# Patient Record
Sex: FEMALE | Race: WHITE
[De-identification: names, ages, dates, MRNs, and addresses within clinical notes are randomized per-mention and may not be internally consistent; named-entity substitution may affect disease eponyms.]

---

## 2017-02-23 ENCOUNTER — HOSPITAL ENCOUNTER (OUTPATIENT)
Dept: HOSPITAL 10 - SDS | Age: 32
Discharge: HOME | End: 2017-02-23
Attending: SURGERY
Payer: COMMERCIAL

## 2017-02-23 VITALS
WEIGHT: 136.69 LBS | BODY MASS INDEX: 22.77 KG/M2 | WEIGHT: 136.69 LBS | HEIGHT: 65 IN | BODY MASS INDEX: 22.77 KG/M2 | HEIGHT: 65 IN

## 2017-02-23 VITALS — DIASTOLIC BLOOD PRESSURE: 67 MMHG | RESPIRATION RATE: 18 BRPM | HEART RATE: 71 BPM | SYSTOLIC BLOOD PRESSURE: 111 MMHG

## 2017-02-23 VITALS — SYSTOLIC BLOOD PRESSURE: 127 MMHG | HEART RATE: 60 BPM | RESPIRATION RATE: 19 BRPM | DIASTOLIC BLOOD PRESSURE: 80 MMHG

## 2017-02-23 VITALS — RESPIRATION RATE: 16 BRPM | SYSTOLIC BLOOD PRESSURE: 114 MMHG | HEART RATE: 64 BPM | DIASTOLIC BLOOD PRESSURE: 70 MMHG

## 2017-02-23 VITALS — HEART RATE: 64 BPM | SYSTOLIC BLOOD PRESSURE: 115 MMHG | RESPIRATION RATE: 16 BRPM | DIASTOLIC BLOOD PRESSURE: 63 MMHG

## 2017-02-23 VITALS — DIASTOLIC BLOOD PRESSURE: 80 MMHG | HEART RATE: 68 BPM | RESPIRATION RATE: 15 BRPM | SYSTOLIC BLOOD PRESSURE: 120 MMHG

## 2017-02-23 VITALS — SYSTOLIC BLOOD PRESSURE: 129 MMHG | DIASTOLIC BLOOD PRESSURE: 76 MMHG | RESPIRATION RATE: 19 BRPM | HEART RATE: 66 BPM

## 2017-02-23 VITALS — SYSTOLIC BLOOD PRESSURE: 115 MMHG | DIASTOLIC BLOOD PRESSURE: 70 MMHG | RESPIRATION RATE: 16 BRPM | HEART RATE: 64 BPM

## 2017-02-23 VITALS — SYSTOLIC BLOOD PRESSURE: 119 MMHG | HEART RATE: 70 BPM | RESPIRATION RATE: 12 BRPM | DIASTOLIC BLOOD PRESSURE: 71 MMHG

## 2017-02-23 VITALS — RESPIRATION RATE: 14 BRPM | HEART RATE: 66 BPM | DIASTOLIC BLOOD PRESSURE: 68 MMHG | SYSTOLIC BLOOD PRESSURE: 110 MMHG

## 2017-02-23 VITALS — SYSTOLIC BLOOD PRESSURE: 134 MMHG | DIASTOLIC BLOOD PRESSURE: 90 MMHG | RESPIRATION RATE: 18 BRPM | HEART RATE: 85 BPM

## 2017-02-23 VITALS — SYSTOLIC BLOOD PRESSURE: 115 MMHG | HEART RATE: 68 BPM | DIASTOLIC BLOOD PRESSURE: 63 MMHG | RESPIRATION RATE: 14 BRPM

## 2017-02-23 VITALS — DIASTOLIC BLOOD PRESSURE: 74 MMHG | SYSTOLIC BLOOD PRESSURE: 120 MMHG | HEART RATE: 72 BPM | RESPIRATION RATE: 15 BRPM

## 2017-02-23 VITALS — HEART RATE: 64 BPM | RESPIRATION RATE: 18 BRPM | DIASTOLIC BLOOD PRESSURE: 67 MMHG | SYSTOLIC BLOOD PRESSURE: 109 MMHG

## 2017-02-23 VITALS — SYSTOLIC BLOOD PRESSURE: 118 MMHG | HEART RATE: 62 BPM | RESPIRATION RATE: 22 BRPM | DIASTOLIC BLOOD PRESSURE: 75 MMHG

## 2017-02-23 DIAGNOSIS — K80.10: Primary | ICD-10-CM

## 2017-02-23 PROCEDURE — 84703 CHORIONIC GONADOTROPIN ASSAY: CPT

## 2017-02-23 PROCEDURE — 47562 LAPAROSCOPIC CHOLECYSTECTOMY: CPT

## 2017-02-23 PROCEDURE — 88304 TISSUE EXAM BY PATHOLOGIST: CPT

## 2017-02-23 RX ADMIN — HYDROMORPHONE HYDROCHLORIDE PRN MG: 2 INJECTION INTRAMUSCULAR; INTRAVENOUS; SUBCUTANEOUS at 14:50

## 2017-02-23 RX ADMIN — HYDROMORPHONE HYDROCHLORIDE PRN MG: 2 INJECTION INTRAMUSCULAR; INTRAVENOUS; SUBCUTANEOUS at 15:01

## 2017-02-23 RX ADMIN — HYDROMORPHONE HYDROCHLORIDE PRN MG: 2 INJECTION INTRAMUSCULAR; INTRAVENOUS; SUBCUTANEOUS at 14:55

## 2017-02-23 NOTE — OPPN
Date/Time of Note


Date/Time of Note


DATE: 2/23/17 


TIME: 14:46





Operative/Procedure Note


dictation [id number]


Pre-Operative Diagnosis


cholelithiasis, symptomatic


Post-Operative Diagnosis


same


Procedure


laparoscopic cholecystectomy


Surgeon:  KRYSTLE KIRK DO


Anesthesiologist:  BRIDGETTE MOORE MD


Findings


4 mm cystic duct


duct and artery separately clipped and ligated


Implants/Grafts:  Not applicable


Estimated blood loss:  0 - 10 ml's


Drains:  Not applicable


Specimens


gallbladder and contents


Complications:  None


Anesthesia type:  general











KRYSTLE KIRK DO Feb 23, 2017 14:53

## 2017-02-23 NOTE — HPN
Date/Time of Note


Date/Time of Note


DATE: 2/23/17 


TIME: 13:09





Interval H&P Admission Note


Pt. seen H&P reviewed:  No system changes











KRYSLTE KIRK DO Feb 23, 2017 13:09

## 2017-02-23 NOTE — OPR
DATE OF OPERATION:  02/23/2017

 

 

PREOPERATIVE DIAGNOSIS:  Symptomatic cholelithiasis with recurrent biliary colic.

 

POSTOPERATIVE DIAGNOSIS:  Symptomatic cholelithiasis with recurrent biliary colic.

 

OPERATION PERFORMED:  Laparoscopic cholecystectomy.

 

PRIMARY SURGEON:  Obey Vega DO

 

ANESTHESIA:  General endotracheal anesthesia.

 

ANESTHESIOLOGIST:  Sawyer Romano MD

 

ASA CLASSIFICATION:  2.

 

WOUND CLASSIFICATION:  Class 2 clean, contaminated wound.  

 

COUNTS:  All counts for instruments, needles and sponges were reported correct at the end of the shauna
e x2.

 

ESTIMATED BLOOD LOSS:  10 mL.

 

DRAINS:  None.

 

SPECIMEN:  Gallbladder and contents.

 

COMPLICATIONS:  None immediately apparent.

 

INDICATION FOR PROCEDURE:  Ms. Lainez is a 31-year-old young lady with a history of biliary 
colic and symptomatic cholelithiasis.  She desired cholecystectomy.  Risks, benefits, and alternativ
es as well as details of the operation were discussed with her at length including, but not limited 
to, bleeding, infection, scarring, hernia, neurologic complications, thromboembolic events, MI, stro
kes, retained or recurrent stones, injury to the common bile duct approximately 0.4% incidence repor
elie in the literature, need for reoperation, negative findings, cardiopulmonary complications, up to
 and including the risk of death or injury to surrounding structures and intraperitoneal organs.  De
spite these and other risks, she wished to proceed with the procedure as planned.  All questions wer
e asked and answered.  Informed consent was verified and documented.

 

DESCRIPTION OF PROCEDURE IN DETAIL:  Ms. Lainez was brought to the operating room and placed
 in supine position on the operating table.  Cardiopulmonary monitors were placed on.  General endot
vikas anesthesia was initiated and the abdomen was prepped and draped in typical surgical fashion.
  A 5 mm supraumbilical incision was made and dissected down to the subcutaneous tissues.  The fasci
a was retracted and a Veress needle was used to access the peritoneal cavity.  The abdomen was insuf
flated to 15 mmHg with CO2, which the patient tolerated well without change in hemodynamic pressures
.  A 5 mm blunt _____ trocar was placed under direct laparoscopic visualization without incident and
 the laparoscope was introduced.  Visualization showed no injury from previous manipulations.  The p
atient was positioned and a 12 mm epigastric and two 5 mm right upper quadrant ports were placed und
er direct laparoscopic visualization and Marcaine infiltration in the subcutaneous tissues.  

 

The gallbladder was grasped at its dome and retracted cephalad towards the diaphragm.  The infundibu
lum was distracted from the liver bed and the medial and lateral peritoneal reflections were divided
.  Electrocautery and blunt dissection cleared the cystic plate and critical view was obtained.  The
 cystic artery and duct were clearly identified, separately ligated and the gallbladder was removed 
from the liver bed using electrocautery.  The gallbladder was placed in a retrieval bag and the righ
t upper quadrant was suctioned and irrigated and was clear.  Hemostasis of the liver bed was verifie
d and the gallbladder was removed through the epigastric port site without dilation.  

 

Hemostasis of each port site was verified by removing the ports under direct visualization.  Pneumop
eritoneum was evacuated and Marcaine was infiltrated in the subcutaneous tissues.  Wounds were close
d with absorbable sutures and Dermabond was used for skin closure and dressing.  The patient tolerat
ed the procedure well.  She was extubated successfully in the operating room, taken to recovery in s
table condition.

 

 

Dictated By: OBEY GARCIA/JON

DD:    02/23/2017 14:53:02

DT:    02/23/2017 15:41:37

Conf#: [id number]

DID#:  [id number]

## 2018-10-31 ENCOUNTER — HOSPITAL ENCOUNTER (OUTPATIENT)
Dept: HOSPITAL 91 - SDS | Age: 33
Discharge: HOME | End: 2018-10-31
Payer: COMMERCIAL

## 2018-10-31 ENCOUNTER — HOSPITAL ENCOUNTER (OUTPATIENT)
Age: 33
Discharge: HOME | End: 2018-10-31

## 2018-10-31 DIAGNOSIS — J34.3: ICD-10-CM

## 2018-10-31 DIAGNOSIS — J34.2: Primary | ICD-10-CM

## 2018-10-31 DIAGNOSIS — I51.9: ICD-10-CM

## 2018-10-31 DIAGNOSIS — J32.0: ICD-10-CM

## 2018-10-31 PROCEDURE — 30140 RESECT INFERIOR TURBINATE: CPT

## 2018-10-31 PROCEDURE — 93005 ELECTROCARDIOGRAM TRACING: CPT

## 2018-10-31 PROCEDURE — 88300 SURGICAL PATH GROSS: CPT

## 2018-10-31 RX ADMIN — OXYMETAZOLINE HYDROCHLORIDE 1 SPRAY: 5 SPRAY NASAL at 16:20

## 2018-10-31 RX ADMIN — OXYMETAZOLINE HYDROCHLORIDE 1 SPRAY: 5 SPRAY NASAL at 16:10

## 2018-10-31 RX ADMIN — OXYCODONE HYDROCHLORIDE AND ACETAMINOPHEN 1 TAB: 5; 325 TABLET ORAL at 17:24

## 2018-10-31 RX ADMIN — OXYMETAZOLINE HYDROCHLORIDE 1 SPRAY: 5 SPRAY NASAL at 16:15

## 2018-10-31 RX ADMIN — OXYMETAZOLINE HYDROCHLORIDE 1 SPRAY: 5 SPRAY NASAL at 12:35

## 2018-10-31 RX ADMIN — LIDOCAINE HYDROCHLORIDE 1 ML: 10; .005 INJECTION, SOLUTION EPIDURAL; INFILTRATION; INTRACAUDAL; PERINEURAL at 12:35

## 2018-11-04 ENCOUNTER — HOSPITAL ENCOUNTER (EMERGENCY)
Dept: HOSPITAL 91 - FTE | Age: 33
Discharge: HOME | End: 2018-11-04
Payer: MEDICAID

## 2018-11-04 ENCOUNTER — HOSPITAL ENCOUNTER (EMERGENCY)
Age: 33
Discharge: HOME | End: 2018-11-04

## 2018-11-04 DIAGNOSIS — G89.18: Primary | ICD-10-CM

## 2018-11-04 DIAGNOSIS — J34.89: ICD-10-CM

## 2018-11-04 PROCEDURE — 99282 EMERGENCY DEPT VISIT SF MDM: CPT
